# Patient Record
Sex: FEMALE | Race: WHITE | NOT HISPANIC OR LATINO | URBAN - METROPOLITAN AREA
[De-identification: names, ages, dates, MRNs, and addresses within clinical notes are randomized per-mention and may not be internally consistent; named-entity substitution may affect disease eponyms.]

---

## 2017-05-09 ENCOUNTER — IMPORTED ENCOUNTER (OUTPATIENT)
Dept: URBAN - METROPOLITAN AREA CLINIC 1 | Facility: CLINIC | Age: 77
End: 2017-05-09

## 2017-05-09 PROBLEM — H01.004: Noted: 2017-05-09

## 2017-05-09 PROBLEM — H43.813: Noted: 2017-05-09

## 2017-05-09 PROBLEM — H16.143: Noted: 2017-05-09

## 2017-05-09 PROBLEM — H04.123: Noted: 2017-05-09

## 2017-05-09 PROBLEM — H01.001: Noted: 2017-05-09

## 2017-05-09 PROBLEM — Z96.1: Noted: 2017-05-09

## 2017-05-09 PROCEDURE — 92014 COMPRE OPH EXAM EST PT 1/>: CPT

## 2017-11-10 ENCOUNTER — IMPORTED ENCOUNTER (OUTPATIENT)
Dept: URBAN - METROPOLITAN AREA CLINIC 1 | Facility: CLINIC | Age: 77
End: 2017-11-10

## 2017-11-10 PROBLEM — H01.004: Noted: 2017-11-10

## 2017-11-10 PROBLEM — H01.001: Noted: 2017-11-10

## 2017-11-10 PROBLEM — H16.143: Noted: 2017-11-10

## 2017-11-10 PROBLEM — H04.123: Noted: 2017-11-10

## 2017-11-10 PROCEDURE — 92015 DETERMINE REFRACTIVE STATE: CPT

## 2017-11-10 PROCEDURE — 92012 INTRM OPH EXAM EST PATIENT: CPT

## 2017-11-10 NOTE — PATIENT DISCUSSION
1.  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 Increase ATs to TID OU routinely. 2.  Anterior Blepharitis OU - Cont Daily warm compresses and lid scrubs were recommended. 3. H/o PVD OU 4. Pseudophakia OU - ([facility] OU- Ramonita Fallon) H/o YAG Cap OUReturn for an appointment in 6 mo 27 with Dr. Veronica Andre.

## 2018-05-07 ENCOUNTER — IMPORTED ENCOUNTER (OUTPATIENT)
Dept: URBAN - METROPOLITAN AREA CLINIC 1 | Facility: CLINIC | Age: 78
End: 2018-05-07

## 2018-05-07 PROBLEM — H01.004: Noted: 2018-05-07

## 2018-05-07 PROBLEM — H16.143: Noted: 2018-05-07

## 2018-05-07 PROBLEM — H04.123: Noted: 2018-05-07

## 2018-05-07 PROBLEM — H01.001: Noted: 2018-05-07

## 2018-05-07 PROCEDURE — 92014 COMPRE OPH EXAM EST PT 1/>: CPT

## 2018-05-07 NOTE — PATIENT DISCUSSION
1.  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 Increase ATs to QID OU routinely. 2.  Anterior Blepharitis OU improved - Cont Daily warm compresses and lid scrubs were recommended. 3. PVD OU - stable observe. 4.  Pseudophakia OU - (Leia OU- Akbar Newton) H/o YAG Cap OUReturn for an appointment in 6 mo 10 with Dr. Lizzy Valladares.

## 2018-11-12 ENCOUNTER — IMPORTED ENCOUNTER (OUTPATIENT)
Dept: URBAN - METROPOLITAN AREA CLINIC 1 | Facility: CLINIC | Age: 78
End: 2018-11-12

## 2018-11-12 PROBLEM — H01.002: Noted: 2018-11-12

## 2018-11-12 PROBLEM — Z96.1: Noted: 2018-11-12

## 2018-11-12 PROBLEM — H16.143: Noted: 2018-11-12

## 2018-11-12 PROBLEM — H04.123: Noted: 2018-11-12

## 2018-11-12 PROBLEM — H01.005: Noted: 2018-11-12

## 2018-11-12 PROCEDURE — 99213 OFFICE O/P EST LOW 20 MIN: CPT

## 2018-11-12 NOTE — PATIENT DISCUSSION
1.  CHAVO w/ PEK OU- Progressed OU. Urged compliance w/ Restasis OU BID. The increase of artificial tears OU to QID were recommended. Plug internalized RLL. Plug fell out LLL. 2.  Blepharitis anterior type OU- Continue daily hot compresses and lid scrubs were recommended. 3. Pseudophakia OU (Dr Farhad Ruiz)- H/o Yag OU.4. H/o PVD OU5. Return for an appointment for 30 in 6 months with Dr. Fransisco Orozco.

## 2019-08-17 ENCOUNTER — IMPORTED ENCOUNTER (OUTPATIENT)
Dept: URBAN - METROPOLITAN AREA CLINIC 1 | Facility: CLINIC | Age: 79
End: 2019-08-17

## 2019-08-17 PROBLEM — H01.004: Noted: 2019-08-17

## 2019-08-17 PROBLEM — H16.143: Noted: 2019-08-17

## 2019-08-17 PROBLEM — H04.123: Noted: 2019-08-17

## 2019-08-17 PROBLEM — H01.001: Noted: 2019-08-17

## 2019-08-17 PROCEDURE — 92015 DETERMINE REFRACTIVE STATE: CPT

## 2019-08-17 PROCEDURE — 92014 COMPRE OPH EXAM EST PT 1/>: CPT

## 2019-08-17 NOTE — PATIENT DISCUSSION
1.  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 Restart ATs to QID OU routinely. Begin AT Gel teri QHS OU. 2.  Anterior Blepharitis OU - Cont Daily warm compresses and lid scrubs were recommended. 3. PVD OU - stable observe. 4.  Pseudophakia OU - ([facility] OU- Lorenzo Libel) H/o YAG Cap OUMRx givenReturn for an appointment in 6 mo 10 with Dr. Jahaira Mays.

## 2020-02-21 ENCOUNTER — IMPORTED ENCOUNTER (OUTPATIENT)
Dept: URBAN - METROPOLITAN AREA CLINIC 1 | Facility: CLINIC | Age: 80
End: 2020-02-21

## 2020-02-21 PROBLEM — H01.004: Noted: 2020-02-21

## 2020-02-21 PROBLEM — H01.001: Noted: 2020-02-21

## 2020-02-21 PROBLEM — Z96.1: Noted: 2020-02-21

## 2020-02-21 PROBLEM — H04.123: Noted: 2020-02-21

## 2020-02-21 PROBLEM — H16.143: Noted: 2020-02-21

## 2020-02-21 PROCEDURE — 92012 INTRM OPH EXAM EST PATIENT: CPT

## 2020-02-21 NOTE — PATIENT DISCUSSION
1.  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 Restart ATs to QID OU routinely. Begin AT Gel teri QHS OU. 2.  Anterior Blepharitis OU - Cont Daily warm compresses and lid scrubs were recommended. 3. PVD OU - stable observe. 4.  Pseudophakia OU - ([facility] OU- Jose Miguel [place]) H/o YAG Cap OUReturn for an appointment in 6 months for a 27 with Dr. Sandip Duenas. Yes that is fine

## 2020-08-14 ENCOUNTER — IMPORTED ENCOUNTER (OUTPATIENT)
Dept: URBAN - METROPOLITAN AREA CLINIC 1 | Facility: CLINIC | Age: 80
End: 2020-08-14

## 2020-08-14 PROBLEM — H43.813: Noted: 2020-08-14

## 2020-08-14 PROBLEM — H01.001: Noted: 2020-08-14

## 2020-08-14 PROBLEM — Z96.1: Noted: 2020-08-14

## 2020-08-14 PROBLEM — H01.004: Noted: 2020-08-14

## 2020-08-14 PROBLEM — H04.123: Noted: 2020-08-14

## 2020-08-14 PROBLEM — H16.143: Noted: 2020-08-14

## 2020-08-14 PROCEDURE — 92014 COMPRE OPH EXAM EST PT 1/>: CPT

## 2020-08-14 NOTE — PATIENT DISCUSSION
1.  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 Restart ATs to QID OU routinely. Begin AT Gel teri QHS OU. 2.  Anterior Blepharitis OU - Cont Daily warm compresses and lid scrubs were recommended. 3. PVD OU - Stable observe. 4.  Pseudophakia OU - ([facility] [place] H/o YAG Cap OUReturn for an appointment in 6 months for a 10/k check with Dr. Kavya William.

## 2021-02-12 ENCOUNTER — IMPORTED ENCOUNTER (OUTPATIENT)
Dept: URBAN - METROPOLITAN AREA CLINIC 1 | Facility: CLINIC | Age: 81
End: 2021-02-12

## 2021-02-12 PROBLEM — H04.123: Noted: 2021-02-12

## 2021-02-12 PROBLEM — H16.143: Noted: 2021-02-12

## 2021-02-12 PROCEDURE — 99213 OFFICE O/P EST LOW 20 MIN: CPT

## 2021-02-12 NOTE — PATIENT DISCUSSION
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 Restart ATs to QID OU routinely. Begin AT Gel teri QHS OU. 2.  Pseudophakia OU - ([facility] OU- Marcus) H/o YAG Cap OU3. Anterior Blepharitis OU - Cont Daily warm compresses and lid scrubs were recommended. 4.  H/o PVD OUReturn for an appointment in 6 months 27 with Dr. Lizzy Valladares.

## 2021-02-12 NOTE — PATIENT DISCUSSION
1.  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 Recommend ATs to QID OU routinely. Begin AT Gel teri QHS OU. 2.  Pseudophakia OU - ([facility] OU- Marcus) H/o YAG Cap OU3. Anterior Blepharitis OU - Cont Daily warm compresses and lid scrubs were recommended. 4.  H/o PVD OUReturn for an appointment in 6 months 27 with Dr. Yahaira Montenegro.

## 2021-11-22 ENCOUNTER — IMPORTED ENCOUNTER (OUTPATIENT)
Dept: URBAN - METROPOLITAN AREA CLINIC 1 | Facility: CLINIC | Age: 81
End: 2021-11-22

## 2021-11-22 PROBLEM — H01.021: Noted: 2021-11-22

## 2021-11-22 PROBLEM — Z96.1: Noted: 2021-11-22

## 2021-11-22 PROBLEM — H01.024: Noted: 2021-11-22

## 2021-11-22 PROBLEM — H16.143: Noted: 2021-11-22

## 2021-11-22 PROBLEM — H04.123: Noted: 2021-11-22

## 2021-11-22 PROBLEM — H43.813: Noted: 2021-11-22

## 2021-11-22 PROBLEM — H01.001: Noted: 2021-11-22

## 2021-11-22 PROBLEM — H01.004: Noted: 2021-11-22

## 2021-11-22 PROCEDURE — 99214 OFFICE O/P EST MOD 30 MIN: CPT

## 2021-11-22 NOTE — PATIENT DISCUSSION
1.  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 Recommend ATs to QID OU routinely. Begin AT Gel teri QHS OU. 2.  Anterior Blepharitis OU - Cont Daily warm compresses and lid scrubs were recommended. 4.  Pseudophakia OU - ([facility] OU- Marcus) H/o YAG Cap OU4. PVD w/o Tear OU - RD precautions. Return for an appointment in 6 months 27 with Dr. Sidney Calabrese.

## 2022-04-02 ASSESSMENT — VISUAL ACUITY
OS_SC: 20/20
OS_CC: J1
OD_CC: J1
OS_CC: J1+
OS_SC: 20/20
OS_CC: J1+
OS_CC: 20/30
OD_SC: 20/20
OD_SC: 20/20-1
OS_SC: 20/20
OS_SC: 20/25
OD_CC: J1
OS_CC: J1
OS_SC: 20/20
OD_CC: 20/25-2
OD_CC: J1
OS_CC: J1
OD_CC: J1
OS_SC: 20/30-1
OS_SC: 20/20
OD_CC: J1+
OS_CC: J1
OS_SC: 20/25+1
OD_SC: 20/20
OD_CC: J1+
OD_SC: 20/20
OS_SC: 20/20
OD_SC: 20/20

## 2022-04-02 ASSESSMENT — TONOMETRY
OD_IOP_MMHG: 16
OS_IOP_MMHG: 16
OS_IOP_MMHG: 15
OS_IOP_MMHG: 18
OD_IOP_MMHG: 14
OD_IOP_MMHG: 15
OS_IOP_MMHG: 15
OD_IOP_MMHG: 16
OD_IOP_MMHG: 16
OS_IOP_MMHG: 16
OD_IOP_MMHG: 20
OD_IOP_MMHG: 16
OD_IOP_MMHG: 16
OS_IOP_MMHG: 16
OD_IOP_MMHG: 16
OS_IOP_MMHG: 15

## 2022-05-23 ENCOUNTER — FOLLOW UP (OUTPATIENT)
Dept: URBAN - METROPOLITAN AREA CLINIC 1 | Facility: CLINIC | Age: 82
End: 2022-05-23

## 2022-05-23 DIAGNOSIS — H10.45: ICD-10-CM

## 2022-05-23 DIAGNOSIS — H16.143: ICD-10-CM

## 2022-05-23 DIAGNOSIS — Z96.1: ICD-10-CM

## 2022-05-23 DIAGNOSIS — H04.123: ICD-10-CM

## 2022-05-23 PROCEDURE — 99213 OFFICE O/P EST LOW 20 MIN: CPT

## 2022-05-23 PROCEDURE — 92015 DETERMINE REFRACTIVE STATE: CPT

## 2022-05-23 NOTE — PATIENT DISCUSSION
(RLL Plug internalized/LLL Puncta Open) Continue Restasis BID OU, ATs QID OU, and AT [place] QHS OU.

## 2022-05-26 ASSESSMENT — TONOMETRY
OD_IOP_MMHG: 16
OS_IOP_MMHG: 16

## 2022-05-26 ASSESSMENT — VISUAL ACUITY
OD_CC: 20/25
OS_CC: 20/25

## 2022-11-28 ENCOUNTER — COMPREHENSIVE EXAM (OUTPATIENT)
Dept: URBAN - METROPOLITAN AREA CLINIC 1 | Facility: CLINIC | Age: 82
End: 2022-11-28

## 2022-11-28 DIAGNOSIS — H10.45: ICD-10-CM

## 2022-11-28 DIAGNOSIS — H04.123: ICD-10-CM

## 2022-11-28 DIAGNOSIS — H35.033: ICD-10-CM

## 2022-11-28 DIAGNOSIS — H01.024: ICD-10-CM

## 2022-11-28 DIAGNOSIS — H01.021: ICD-10-CM

## 2022-11-28 DIAGNOSIS — H16.143: ICD-10-CM

## 2022-11-28 DIAGNOSIS — Z96.1: ICD-10-CM

## 2022-11-28 PROCEDURE — 99214 OFFICE O/P EST MOD 30 MIN: CPT

## 2022-11-28 ASSESSMENT — TONOMETRY
OD_IOP_MMHG: 15
OS_IOP_MMHG: 15

## 2022-11-28 ASSESSMENT — VISUAL ACUITY
OD_CC: 20/25
OD_CC: J1
OS_CC: J1
OS_CC: 20/25

## 2023-06-08 ENCOUNTER — FOLLOW UP (OUTPATIENT)
Dept: URBAN - METROPOLITAN AREA CLINIC 1 | Facility: CLINIC | Age: 83
End: 2023-06-08

## 2023-06-08 DIAGNOSIS — H16.143: ICD-10-CM

## 2023-06-08 DIAGNOSIS — H04.123: ICD-10-CM

## 2023-06-08 PROCEDURE — 99213 OFFICE O/P EST LOW 20 MIN: CPT

## 2023-06-08 ASSESSMENT — VISUAL ACUITY
OS_CC: 20/25-2
OU_CC: J1+
OD_CC: 20/25

## 2023-06-08 ASSESSMENT — TONOMETRY
OD_IOP_MMHG: 15
OS_IOP_MMHG: 15

## 2024-03-01 ENCOUNTER — COMPREHENSIVE EXAM (OUTPATIENT)
Dept: URBAN - METROPOLITAN AREA CLINIC 1 | Facility: CLINIC | Age: 84
End: 2024-03-01

## 2024-03-01 DIAGNOSIS — H04.123: ICD-10-CM

## 2024-03-01 DIAGNOSIS — H16.143: ICD-10-CM

## 2024-03-01 DIAGNOSIS — H01.024: ICD-10-CM

## 2024-03-01 DIAGNOSIS — H01.021: ICD-10-CM

## 2024-03-01 PROCEDURE — 99214 OFFICE O/P EST MOD 30 MIN: CPT

## 2024-03-01 ASSESSMENT — TONOMETRY
OS_IOP_MMHG: 14
OD_IOP_MMHG: 14

## 2024-03-01 ASSESSMENT — VISUAL ACUITY
OD_CC: 20/20-1
OS_CC: 20/25

## 2024-09-05 ENCOUNTER — FOLLOW UP (OUTPATIENT)
Dept: URBAN - METROPOLITAN AREA CLINIC 1 | Facility: CLINIC | Age: 84
End: 2024-09-05

## 2024-09-05 DIAGNOSIS — H01.024: ICD-10-CM

## 2024-09-05 DIAGNOSIS — H16.143: ICD-10-CM

## 2024-09-05 DIAGNOSIS — H01.021: ICD-10-CM

## 2024-09-05 DIAGNOSIS — H04.123: ICD-10-CM

## 2024-09-05 PROCEDURE — 92012 INTRM OPH EXAM EST PATIENT: CPT
